# Patient Record
Sex: FEMALE | Race: WHITE
[De-identification: names, ages, dates, MRNs, and addresses within clinical notes are randomized per-mention and may not be internally consistent; named-entity substitution may affect disease eponyms.]

---

## 2019-04-09 ENCOUNTER — HOSPITAL ENCOUNTER (OUTPATIENT)
Dept: HOSPITAL 53 - M SFHCCLAY | Age: 34
End: 2019-04-09
Attending: NURSE PRACTITIONER
Payer: COMMERCIAL

## 2019-04-09 DIAGNOSIS — G47.30: Primary | ICD-10-CM

## 2019-04-09 DIAGNOSIS — Z13.1: ICD-10-CM

## 2019-04-09 LAB
ALBUMIN SERPL BCG-MCNC: 3.9 GM/DL (ref 3.2–5.2)
ALT SERPL W P-5'-P-CCNC: 24 U/L (ref 12–78)
BASOPHILS # BLD AUTO: 0 10^3/UL (ref 0–0.2)
BASOPHILS NFR BLD AUTO: 0.3 % (ref 0–1)
BILIRUB SERPL-MCNC: 0.3 MG/DL (ref 0.2–1)
BUN SERPL-MCNC: 13 MG/DL (ref 7–18)
CALCIUM SERPL-MCNC: 9.3 MG/DL (ref 8.5–10.1)
CHLORIDE SERPL-SCNC: 105 MEQ/L (ref 98–107)
CO2 SERPL-SCNC: 28 MEQ/L (ref 21–32)
CREAT SERPL-MCNC: 0.71 MG/DL (ref 0.55–1.3)
EOSINOPHIL # BLD AUTO: 0.1 10^3/UL (ref 0–0.5)
EOSINOPHIL NFR BLD AUTO: 1.5 % (ref 0–3)
EST. AVERAGE GLUCOSE BLD GHB EST-MCNC: 88 MG/DL (ref 60–110)
GFR SERPL CREATININE-BSD FRML MDRD: > 60 ML/MIN/{1.73_M2} (ref 60–?)
GLUCOSE SERPL-MCNC: 97 MG/DL (ref 70–100)
HCT VFR BLD AUTO: 37.5 % (ref 36–47)
HGB BLD-MCNC: 12.9 G/DL (ref 12–15.5)
LYMPHOCYTES # BLD AUTO: 2.6 10^3/UL (ref 1.5–4.5)
LYMPHOCYTES NFR BLD AUTO: 34.4 % (ref 24–44)
MCH RBC QN AUTO: 30.4 PG (ref 27–33)
MCHC RBC AUTO-ENTMCNC: 34.4 G/DL (ref 32–36.5)
MCV RBC AUTO: 88.2 FL (ref 80–96)
MONOCYTES # BLD AUTO: 0.6 10^3/UL (ref 0–0.8)
MONOCYTES NFR BLD AUTO: 8.1 % (ref 0–5)
NEUTROPHILS # BLD AUTO: 4.2 10^3/UL (ref 1.8–7.7)
NEUTROPHILS NFR BLD AUTO: 55.4 % (ref 36–66)
PLATELET # BLD AUTO: 342 10^3/UL (ref 150–450)
POTASSIUM SERPL-SCNC: 4.4 MEQ/L (ref 3.5–5.1)
PROT SERPL-MCNC: 7.4 GM/DL (ref 6.4–8.2)
RBC # BLD AUTO: 4.25 10^6/UL (ref 4–5.4)
SODIUM SERPL-SCNC: 138 MEQ/L (ref 136–145)
WBC # BLD AUTO: 7.5 10^3/UL (ref 4–10)

## 2019-04-09 PROCEDURE — 85025 COMPLETE CBC W/AUTO DIFF WBC: CPT

## 2019-04-09 PROCEDURE — 83036 HEMOGLOBIN GLYCOSYLATED A1C: CPT

## 2019-04-09 PROCEDURE — 80053 COMPREHEN METABOLIC PANEL: CPT

## 2019-06-13 ENCOUNTER — HOSPITAL ENCOUNTER (OUTPATIENT)
Dept: HOSPITAL 53 - M SLEEP | Age: 34
End: 2019-06-13
Attending: NURSE PRACTITIONER
Payer: COMMERCIAL

## 2019-06-13 DIAGNOSIS — G47.61: Primary | ICD-10-CM

## 2019-06-18 NOTE — SLEEPCENT
DATE OF PROCEDURE: 06/13/2019

 

ORDERED BY:  Elisha Shea NP

 

Nocturnal polysomnography was performed for evaluation of sleep physiology in

this patient with a history of abnormal breathing in sleep, hypnagogic and

hypnopomic hallucinations.

 

7 hours and 12 minutes of data were reviewed.  There 299 minutes of sleep

identified.  Sleep latency was prolonged at 82 minutes.  Rapid eye movement (REM)

latency was prolonged at 143 minutes.  Sleep architecture was fairly

well-preserved. There were two REM cycles noted.  Overall sleep efficiency was

70.9%.  Electrocardiogram showed sinus rhythm with an average heart rate of 80

beats per minute.  Electroencephalogram (EEG) showed some coarsening in

background.  No focal events were identified.  There were normal waveforms for

awake and sleep.  There were only 14 respiratory events identified of 10 seconds

in duration or greater for an apnea-hypopnea index within normal limits at 2.8.

Some snoring was noted.  Arousals from respiratory events in total occurred only

1.6 times per hour there was however significant limb movement activity seen.

There were three trains of 30 events and limb movement arousal index was 8.2.

 

IMPRESSION:

1.  Periodic limb movement disorder (G47.61).

2.  Limb movement arousal index 8.2.

 

RECOMMENDATIONS:

Interventions to reduce the frequency of arousals from limb activity may improve

the quality of the patient's sleep.

## 2019-08-22 ENCOUNTER — HOSPITAL ENCOUNTER (OUTPATIENT)
Dept: HOSPITAL 53 - M RAD | Age: 34
End: 2019-08-22
Attending: NURSE PRACTITIONER
Payer: COMMERCIAL

## 2019-08-22 DIAGNOSIS — R10.11: Primary | ICD-10-CM

## 2019-08-22 PROCEDURE — 78227 HEPATOBIL SYST IMAGE W/DRUG: CPT

## 2019-08-22 NOTE — REP
Hepatobiliary scan and gallbladder ejection fraction:

 

History:  Calculus in the gallbladder without cholecystitis.  Right upper

quadrant pain.

 

Technique:  6.6 mCi of technetium-99m mebrofenin was injected and sequential

anterior images are acquired.  65 minutes after the mebrofenin injection, the

patient consumed 8 ounces Ensure and an additional 60 minutes of imaging was

acquired.  Regions of interest are plotted around the gallbladder.

 

Findings:  The initial hepatocellular parenchymal uptake phase is normal and

homogeneous.  Intra- and extra-hepatic bile ducts are labeled by the 10 -minute

image.  The gallbladder is first labeled on the 10 -minute image.  There is

normal washout from the liver parenchyma into the gallbladder and small intestine

on subsequent images.  The gallbladder ejection fraction is 95 %. Values greater

than 35 % are considered normal with this technique.

 

Impression:

 

Normal hepatobiliary scan and normal gallbladder ejection fraction.

 

 

Electronically Signed by

Zohaib Figueredo MD 08/22/2019 11:59 A

## 2019-09-09 ENCOUNTER — HOSPITAL ENCOUNTER (OUTPATIENT)
Dept: HOSPITAL 53 - M SFHCCLAY | Age: 34
End: 2019-09-09
Attending: NURSE PRACTITIONER
Payer: COMMERCIAL

## 2019-09-09 DIAGNOSIS — Z01.419: Primary | ICD-10-CM

## 2020-10-29 ENCOUNTER — HOSPITAL ENCOUNTER (OUTPATIENT)
Dept: HOSPITAL 53 - M SFHCCLAY | Age: 35
End: 2020-10-29
Attending: NURSE PRACTITIONER
Payer: COMMERCIAL

## 2020-10-29 DIAGNOSIS — Z3A.01: Primary | ICD-10-CM

## 2020-10-29 PROCEDURE — 84702 CHORIONIC GONADOTROPIN TEST: CPT

## 2021-04-26 ENCOUNTER — HOSPITAL ENCOUNTER (EMERGENCY)
Dept: HOSPITAL 53 - M ED | Age: 36
Discharge: HOME | End: 2021-04-26
Payer: COMMERCIAL

## 2021-04-26 VITALS — HEIGHT: 66 IN | BODY MASS INDEX: 25.55 KG/M2 | WEIGHT: 158.95 LBS

## 2021-04-26 VITALS — SYSTOLIC BLOOD PRESSURE: 113 MMHG | DIASTOLIC BLOOD PRESSURE: 68 MMHG

## 2021-04-26 DIAGNOSIS — O03.9: Primary | ICD-10-CM

## 2021-04-26 DIAGNOSIS — I45.19: ICD-10-CM

## 2021-04-26 LAB
B-HCG SERPL-ACNC: 3361 MIU/ML
BASOPHILS # BLD AUTO: 0 10^3/UL (ref 0–0.2)
BASOPHILS NFR BLD AUTO: 0.1 % (ref 0–1)
BUN SERPL-MCNC: 10 MG/DL (ref 7–18)
CALCIUM SERPL-MCNC: 8.7 MG/DL (ref 8.5–10.1)
CHLORIDE SERPL-SCNC: 105 MEQ/L (ref 98–107)
CO2 SERPL-SCNC: 25 MEQ/L (ref 21–32)
CREAT SERPL-MCNC: 0.66 MG/DL (ref 0.55–1.3)
EOSINOPHIL # BLD AUTO: 0 10^3/UL (ref 0–0.5)
EOSINOPHIL NFR BLD AUTO: 0.2 % (ref 0–3)
GFR SERPL CREATININE-BSD FRML MDRD: > 60 ML/MIN/{1.73_M2} (ref 60–?)
GLUCOSE SERPL-MCNC: 92 MG/DL (ref 70–100)
HCT VFR BLD AUTO: 30 % (ref 36–47)
HGB BLD-MCNC: 10.3 G/DL (ref 12–15.5)
LYMPHOCYTES # BLD AUTO: 1.7 10^3/UL (ref 1.5–5)
LYMPHOCYTES NFR BLD AUTO: 11.8 % (ref 24–44)
MCH RBC QN AUTO: 30.9 PG (ref 27–33)
MCHC RBC AUTO-ENTMCNC: 34.3 G/DL (ref 32–36.5)
MCV RBC AUTO: 90.1 FL (ref 80–96)
MONOCYTES # BLD AUTO: 0.6 10^3/UL (ref 0–0.8)
MONOCYTES NFR BLD AUTO: 4.4 % (ref 2–8)
NEUTROPHILS # BLD AUTO: 11.7 10^3/UL (ref 1.5–8.5)
NEUTROPHILS NFR BLD AUTO: 82.9 % (ref 36–66)
PLATELET # BLD AUTO: 254 10^3/UL (ref 150–450)
POTASSIUM SERPL-SCNC: 3.7 MEQ/L (ref 3.5–5.1)
RBC # BLD AUTO: 3.33 10^6/UL (ref 4–5.4)
RSV RNA NPH QL NAA+PROBE: NEGATIVE
SODIUM SERPL-SCNC: 140 MEQ/L (ref 136–145)
WBC # BLD AUTO: 14.2 10^3/UL (ref 4–10)

## 2021-04-26 PROCEDURE — 86850 RBC ANTIBODY SCREEN: CPT

## 2021-04-26 PROCEDURE — 88305 TISSUE EXAM BY PATHOLOGIST: CPT

## 2021-04-26 PROCEDURE — 96361 HYDRATE IV INFUSION ADD-ON: CPT

## 2021-04-26 PROCEDURE — 86901 BLOOD TYPING SEROLOGIC RH(D): CPT

## 2021-04-26 PROCEDURE — 76801 OB US < 14 WKS SINGLE FETUS: CPT

## 2021-04-26 PROCEDURE — 87631 RESP VIRUS 3-5 TARGETS: CPT

## 2021-04-26 PROCEDURE — 86900 BLOOD TYPING SEROLOGIC ABO: CPT

## 2021-04-26 PROCEDURE — 99285 EMERGENCY DEPT VISIT HI MDM: CPT

## 2021-04-26 PROCEDURE — 96372 THER/PROPH/DIAG INJ SC/IM: CPT

## 2021-04-26 PROCEDURE — 84702 CHORIONIC GONADOTROPIN TEST: CPT

## 2021-04-26 PROCEDURE — 93005 ELECTROCARDIOGRAM TRACING: CPT

## 2021-04-26 PROCEDURE — 93041 RHYTHM ECG TRACING: CPT

## 2021-04-26 PROCEDURE — 80048 BASIC METABOLIC PNL TOTAL CA: CPT

## 2021-04-26 PROCEDURE — 85025 COMPLETE CBC W/AUTO DIFF WBC: CPT

## 2021-04-26 NOTE — ECGEPIP
[facility] - ED

                                       

                                       Test Date:    2021

Pat Name:     JUSTIN SHEEHAN        Department:   

Patient ID:   [id number]                 Room:         -

Gender:       Female                   Technician:   PIERRE

:          1985               Requested By: Maja Lundborg-Gray 

Order Number: [id number]     Reading MD:   Omega Ferrell

                                 Measurements

Intervals                              Axis          

Rate:         105                      P:            84

AL:           132                      QRS:          56

QRSD:         88                       T:            45

QT:           330                                    

QTc:          436                                    

                           Interpretive Statements

Sinus tachycardia

INCOMPLETE RIGHT BUNDLE BRANCH BLOCK

NO PRIORS FOR COMPARISON

Electronically Signed on 2021 20:36:03 EDT by Omega Ferrell

## 2021-04-26 NOTE — REP
INDICATION:

VAGINAL BLEEDING.



COMPARISON:

None.



TECHNIQUE:

Real-time sonographic evaluation of pelvis performed utilizing transabdominal

technique.



FINDINGS:

Uterus measures 11.7 x 5.7 x 7.9 cm.  Endometrial echo complex measures 12 mm.  No

intrauterine gestational sac or definite retained products of conception are

visualized.



Right ovary measures 5.6 x 3.3 x 4.2 cm.  A simple cyst measures 4.3 x 3.1 x 3.7 cm.

Left ovary measures 3.3 x 1.5 x 1.8 cm.  No other evidence of adnexal mass or free

fluid.



IMPRESSION:

Endometrial echo complex 12 mm.  No intrauterine gestational sac or definite retained

products of conception.



Simple cyst right ovary 4.3 cm.  No other evidence of adnexal mass or free fluid.





<Electronically signed by Nain Washington > 04/26/21 3020

## 2021-09-15 ENCOUNTER — HOSPITAL ENCOUNTER (EMERGENCY)
Dept: HOSPITAL 53 - M ED | Age: 36
LOS: 1 days | Discharge: HOME | End: 2021-09-16
Payer: COMMERCIAL

## 2021-09-15 VITALS — WEIGHT: 160.5 LBS | HEIGHT: 66 IN | BODY MASS INDEX: 25.79 KG/M2

## 2021-09-15 DIAGNOSIS — O20.0: Primary | ICD-10-CM

## 2021-09-15 DIAGNOSIS — Z3A.01: ICD-10-CM

## 2021-09-15 DIAGNOSIS — O20.8: ICD-10-CM

## 2021-09-15 DIAGNOSIS — Z87.59: ICD-10-CM

## 2021-09-15 LAB
BASOPHILS # BLD AUTO: 0 10^3/UL (ref 0–0.2)
BASOPHILS NFR BLD AUTO: 0.2 % (ref 0–1)
EOSINOPHIL # BLD AUTO: 0.1 10^3/UL (ref 0–0.5)
EOSINOPHIL NFR BLD AUTO: 1.2 % (ref 0–3)
HCT VFR BLD AUTO: 37.1 % (ref 36–47)
HGB BLD-MCNC: 12.8 G/DL (ref 12–15.5)
LYMPHOCYTES # BLD AUTO: 2.6 10^3/UL (ref 1.5–5)
LYMPHOCYTES NFR BLD AUTO: 30.7 % (ref 24–44)
MCH RBC QN AUTO: 30.9 PG (ref 27–33)
MCHC RBC AUTO-ENTMCNC: 34.5 G/DL (ref 32–36.5)
MCV RBC AUTO: 89.6 FL (ref 80–96)
MONOCYTES # BLD AUTO: 0.6 10^3/UL (ref 0–0.8)
MONOCYTES NFR BLD AUTO: 7 % (ref 2–8)
NEUTROPHILS # BLD AUTO: 5 10^3/UL (ref 1.5–8.5)
NEUTROPHILS NFR BLD AUTO: 60.4 % (ref 36–66)
PLATELET # BLD AUTO: 287 10^3/UL (ref 150–450)
RBC # BLD AUTO: 4.14 10^6/UL (ref 4–5.4)
WBC # BLD AUTO: 8.3 10^3/UL (ref 4–10)

## 2021-09-15 PROCEDURE — 85025 COMPLETE CBC W/AUTO DIFF WBC: CPT

## 2021-09-15 PROCEDURE — 87491 CHLMYD TRACH DNA AMP PROBE: CPT

## 2021-09-15 PROCEDURE — 96372 THER/PROPH/DIAG INJ SC/IM: CPT

## 2021-09-15 PROCEDURE — 87591 N.GONORRHOEAE DNA AMP PROB: CPT

## 2021-09-15 PROCEDURE — 86901 BLOOD TYPING SEROLOGIC RH(D): CPT

## 2021-09-15 PROCEDURE — 87210 SMEAR WET MOUNT SALINE/INK: CPT

## 2021-09-15 PROCEDURE — 76801 OB US < 14 WKS SINGLE FETUS: CPT

## 2021-09-15 PROCEDURE — 86900 BLOOD TYPING SEROLOGIC ABO: CPT

## 2021-09-15 PROCEDURE — 86850 RBC ANTIBODY SCREEN: CPT

## 2021-09-15 PROCEDURE — 99283 EMERGENCY DEPT VISIT LOW MDM: CPT

## 2021-09-15 PROCEDURE — 76817 TRANSVAGINAL US OBSTETRIC: CPT

## 2021-09-15 PROCEDURE — 81001 URINALYSIS AUTO W/SCOPE: CPT

## 2021-09-15 PROCEDURE — 84702 CHORIONIC GONADOTROPIN TEST: CPT

## 2021-09-15 NOTE — REPVR
PROCEDURE INFORMATION: 

Exam: US Pregnancy First Trimester, Transabdominal and US Pregnancy, 

Transvaginal 

Exam date and time: 9/15/2021 10:04 PM 

Age: 36 years old 

Clinical indication: Lmp or gestational age (in weeks): 6w 1d; Other: Vaginal 

bleeding; Pregnant 



TECHNIQUE: 

Imaging protocol: Real-time transabdominal obstetrical ultrasound of the 

maternal pelvis and a first trimester pregnancy, less than 14 weeks 0 days, 

with image documentation. Transvaginal imaging was used for better evaluation 

of the fetus, adnexa, and/or cervix. 



COMPARISON: 

No relevant prior studies available. 



FINDINGS: 

Gestation: Single live intrauterine gestation. 

Embryonic/fetal heart rate: Fetal cardiac activity is detected at 99 bpm. 

Extra-embryonic membranes/Placenta: There is adjacent subchorionic hemorrhage 

measuring 2.2 x 2.3 cm. 

Amniotic fluid: Amniotic fluid is normal for gestational age. 



BIOMETRY: 

Gestational age (AUA): Estimated gestational age is 6 weeks and 1 day. 

Crown-Rump length: Crown-rump length measures 40 mm. 



MATERNAL: 

Uterus: Uterus measures 8.8 x 5.7 x 7.7 cm. 

Cervix: Unremarkable. 

Right adnexa: Right ovary measures 4.4 x 4.0 x 4.0 cm. Simple appearing right 

ovarian cyst measuring 3.7 cm. 

Left adnexa: Left ovary is not visualized. 

Intraperitoneal space: No intraperitoneal free fluid. 



IMPRESSION: 

1. Single live intrauterine gestation as above with adjacent subchorionic 

hemorrhage. 

2. Fetal cardiac activity is detected at 99 bpm, low. 

3. Follow-up is recommended. 



Electronically signed by: Flako Guerra On 09/15/2021  23:08:39 PM

## 2021-09-16 ENCOUNTER — HOSPITAL ENCOUNTER (OUTPATIENT)
Dept: HOSPITAL 53 - M WHC | Age: 36
End: 2021-09-16
Attending: OBSTETRICS & GYNECOLOGY
Payer: COMMERCIAL

## 2021-09-16 VITALS — DIASTOLIC BLOOD PRESSURE: 76 MMHG | SYSTOLIC BLOOD PRESSURE: 124 MMHG

## 2021-09-16 DIAGNOSIS — O36.80X1: ICD-10-CM

## 2021-09-16 DIAGNOSIS — Z3A.00: ICD-10-CM

## 2021-09-16 DIAGNOSIS — Z53.9: Primary | ICD-10-CM

## 2021-09-16 LAB — N GONORRHOEA RRNA SPEC QL NAA+PROBE: NEGATIVE

## 2021-09-16 NOTE — ED PDOC
Post-Departure Follow-Up


dr pradhan faxed 1st trimester us for fu mlg Lundborg-Gray,Maja MD          Sep 16, 2021 11:23

## 2021-09-29 ENCOUNTER — HOSPITAL ENCOUNTER (OUTPATIENT)
Dept: HOSPITAL 53 - M PLALAB | Age: 36
End: 2021-09-29
Attending: OBSTETRICS & GYNECOLOGY
Payer: COMMERCIAL

## 2021-09-29 DIAGNOSIS — N96: Primary | ICD-10-CM

## 2021-09-29 PROCEDURE — 86644 CMV ANTIBODY: CPT

## 2021-09-29 PROCEDURE — 86777 TOXOPLASMA ANTIBODY: CPT

## 2021-09-29 PROCEDURE — 86778 TOXOPLASMA ANTIBODY IGM: CPT

## 2021-09-29 PROCEDURE — 85730 THROMBOPLASTIN TIME PARTIAL: CPT

## 2021-09-29 PROCEDURE — 86645 CMV ANTIBODY IGM: CPT

## 2021-09-29 PROCEDURE — 36415 COLL VENOUS BLD VENIPUNCTURE: CPT

## 2021-09-29 PROCEDURE — 86147 CARDIOLIPIN ANTIBODY EA IG: CPT

## 2021-10-01 LAB
CARDIOLIPIN IGA SER IA-ACNC: <9 APL U/ML (ref 0–11)
CARDIOLIPIN IGG SER IA-ACNC: <9 GPL U/ML (ref 0–14)
CARDIOLIPIN IGM SER IA-ACNC: <9 MPL U/ML (ref 0–12)
CMV IGG SERPL IA-ACNC: >10 U/ML (ref 0–0.59)
CMV IGM SERPL IA-ACNC: <30 AU/ML (ref 0–29.9)
T GONDII IGG SERPL IA-ACNC: <3 IU/ML (ref 0–7.1)

## 2021-10-08 LAB
DRVVT CONFIRM PPP QL: 35.9 SEC
DRVVT SCREEN TO CONFIRM RATIO: 1 {RATIO} (ref 0–1.2)

## 2021-11-16 ENCOUNTER — HOSPITAL ENCOUNTER (OUTPATIENT)
Dept: HOSPITAL 53 - M PLALAB | Age: 36
End: 2021-11-16
Attending: OBSTETRICS & GYNECOLOGY
Payer: COMMERCIAL

## 2021-11-16 DIAGNOSIS — Z34.81: Primary | ICD-10-CM

## 2021-11-16 DIAGNOSIS — Z3A.00: ICD-10-CM

## 2021-11-16 LAB
BASOPHILS # BLD AUTO: 0 10^3/UL (ref 0–0.2)
BASOPHILS NFR BLD AUTO: 0.2 % (ref 0–1)
EOSINOPHIL # BLD AUTO: 0 10^3/UL (ref 0–0.5)
EOSINOPHIL NFR BLD AUTO: 0.4 % (ref 0–3)
HCT VFR BLD AUTO: 36.6 % (ref 36–47)
HCV AB SER QL: 0.1 INDEX (ref ?–0.8)
HGB BLD-MCNC: 12.7 G/DL (ref 12–15.5)
HIV 1+2 AB+HIV1 P24 AG SERPL QL IA: NEGATIVE
LYMPHOCYTES # BLD AUTO: 2.1 10^3/UL (ref 1.5–5)
LYMPHOCYTES NFR BLD AUTO: 18.9 % (ref 24–44)
MCH RBC QN AUTO: 30.5 PG (ref 27–33)
MCHC RBC AUTO-ENTMCNC: 34.7 G/DL (ref 32–36.5)
MCV RBC AUTO: 88 FL (ref 80–96)
MONOCYTES # BLD AUTO: 0.8 10^3/UL (ref 0–0.8)
MONOCYTES NFR BLD AUTO: 7.4 % (ref 2–8)
N GONORRHOEA RRNA SPEC QL NAA+PROBE: NEGATIVE
NEUTROPHILS # BLD AUTO: 8 10^3/UL (ref 1.5–8.5)
NEUTROPHILS NFR BLD AUTO: 72.7 % (ref 36–66)
PLATELET # BLD AUTO: 299 10^3/UL (ref 150–450)
RBC # BLD AUTO: 4.16 10^6/UL (ref 4–5.4)
WBC # BLD AUTO: 11 10^3/UL (ref 4–10)

## 2021-12-15 ENCOUNTER — HOSPITAL ENCOUNTER (OUTPATIENT)
Dept: HOSPITAL 53 - M PLALAB | Age: 36
End: 2021-12-15
Attending: OBSTETRICS & GYNECOLOGY
Payer: COMMERCIAL

## 2021-12-15 DIAGNOSIS — O28.5: Primary | ICD-10-CM

## 2021-12-15 DIAGNOSIS — Z23: ICD-10-CM

## 2021-12-15 DIAGNOSIS — Z3A.00: ICD-10-CM

## 2021-12-15 PROCEDURE — 36415 COLL VENOUS BLD VENIPUNCTURE: CPT

## 2021-12-15 PROCEDURE — 90686 IIV4 VACC NO PRSV 0.5 ML IM: CPT

## 2021-12-15 PROCEDURE — 90471 IMMUNIZATION ADMIN: CPT

## 2022-02-07 ENCOUNTER — HOSPITAL ENCOUNTER (OUTPATIENT)
Dept: HOSPITAL 53 - M PLALAB | Age: 37
End: 2022-02-07
Attending: OBSTETRICS & GYNECOLOGY
Payer: COMMERCIAL

## 2022-02-07 DIAGNOSIS — Z67.91: Primary | ICD-10-CM

## 2022-02-07 PROCEDURE — 86886 COOMBS TEST INDIRECT TITER: CPT

## 2022-02-07 PROCEDURE — 36415 COLL VENOUS BLD VENIPUNCTURE: CPT

## 2022-02-15 ENCOUNTER — HOSPITAL ENCOUNTER (OUTPATIENT)
Dept: HOSPITAL 53 - M WHC | Age: 37
End: 2022-02-15
Attending: ADVANCED PRACTICE MIDWIFE
Payer: COMMERCIAL

## 2022-02-15 DIAGNOSIS — Z3A.15: ICD-10-CM

## 2022-02-15 DIAGNOSIS — Z34.82: Primary | ICD-10-CM

## 2022-02-22 ENCOUNTER — HOSPITAL ENCOUNTER (OUTPATIENT)
Dept: HOSPITAL 53 - M SFHCCLAY | Age: 37
End: 2022-02-22
Attending: NURSE PRACTITIONER
Payer: COMMERCIAL

## 2022-02-22 DIAGNOSIS — J02.9: Primary | ICD-10-CM

## 2022-03-29 ENCOUNTER — HOSPITAL ENCOUNTER (OUTPATIENT)
Dept: HOSPITAL 53 - M PLALAB | Age: 37
End: 2022-03-29
Attending: OBSTETRICS & GYNECOLOGY
Payer: COMMERCIAL

## 2022-03-29 DIAGNOSIS — Z3A.23: ICD-10-CM

## 2022-03-29 DIAGNOSIS — Z34.92: Primary | ICD-10-CM

## 2022-03-29 LAB
HCT VFR BLD AUTO: 31.9 % (ref 36–47)
HGB BLD-MCNC: 11.2 G/DL (ref 12–15.5)
MCH RBC QN AUTO: 31.2 PG (ref 27–33)
MCHC RBC AUTO-ENTMCNC: 35.1 G/DL (ref 32–36.5)
MCV RBC AUTO: 88.9 FL (ref 80–96)
N GONORRHOEA RRNA SPEC QL NAA+PROBE: NEGATIVE
PLATELET # BLD AUTO: 228 10^3/UL (ref 150–450)
RBC # BLD AUTO: 3.59 10^6/UL (ref 4–5.4)
WBC # BLD AUTO: 10.1 10^3/UL (ref 4–10)

## 2022-03-29 PROCEDURE — 86901 BLOOD TYPING SEROLOGIC RH(D): CPT

## 2022-03-29 PROCEDURE — 82950 GLUCOSE TEST: CPT

## 2022-03-29 PROCEDURE — 87850 N. GONORRHOEAE ASSAY W/OPTIC: CPT

## 2022-03-29 PROCEDURE — 36415 COLL VENOUS BLD VENIPUNCTURE: CPT

## 2022-03-29 PROCEDURE — 87810 CHLMYD TRACH ASSAY W/OPTIC: CPT

## 2022-03-29 PROCEDURE — 86900 BLOOD TYPING SEROLOGIC ABO: CPT

## 2022-03-29 PROCEDURE — 85027 COMPLETE CBC AUTOMATED: CPT

## 2022-03-29 PROCEDURE — 86850 RBC ANTIBODY SCREEN: CPT

## 2022-05-31 ENCOUNTER — HOSPITAL ENCOUNTER (OUTPATIENT)
Dept: HOSPITAL 53 - M SFHCPLAZ | Age: 37
End: 2022-05-31
Attending: OBSTETRICS & GYNECOLOGY
Payer: COMMERCIAL

## 2022-05-31 DIAGNOSIS — Z36.85: Primary | ICD-10-CM

## 2022-05-31 PROCEDURE — 87081 CULTURE SCREEN ONLY: CPT

## 2022-06-22 ENCOUNTER — HOSPITAL ENCOUNTER (OUTPATIENT)
Dept: HOSPITAL 53 - M LABSMTC | Age: 37
End: 2022-06-22
Attending: ANESTHESIOLOGY
Payer: COMMERCIAL

## 2022-06-22 DIAGNOSIS — Z01.812: Primary | ICD-10-CM

## 2022-06-22 DIAGNOSIS — Z20.822: ICD-10-CM

## 2022-06-27 ENCOUNTER — HOSPITAL ENCOUNTER (INPATIENT)
Dept: HOSPITAL 53 - M LDI | Age: 37
LOS: 2 days | Discharge: HOME | DRG: 764 | End: 2022-06-29
Attending: OBSTETRICS & GYNECOLOGY | Admitting: OBSTETRICS & GYNECOLOGY
Payer: COMMERCIAL

## 2022-06-27 VITALS — SYSTOLIC BLOOD PRESSURE: 118 MMHG | DIASTOLIC BLOOD PRESSURE: 73 MMHG

## 2022-06-27 VITALS — SYSTOLIC BLOOD PRESSURE: 112 MMHG | DIASTOLIC BLOOD PRESSURE: 70 MMHG

## 2022-06-27 VITALS — HEIGHT: 65 IN | WEIGHT: 205.47 LBS | BODY MASS INDEX: 34.23 KG/M2

## 2022-06-27 VITALS — SYSTOLIC BLOOD PRESSURE: 103 MMHG | DIASTOLIC BLOOD PRESSURE: 60 MMHG

## 2022-06-27 VITALS — SYSTOLIC BLOOD PRESSURE: 98 MMHG | DIASTOLIC BLOOD PRESSURE: 54 MMHG

## 2022-06-27 VITALS — DIASTOLIC BLOOD PRESSURE: 70 MMHG | SYSTOLIC BLOOD PRESSURE: 112 MMHG

## 2022-06-27 DIAGNOSIS — O34.211: Primary | ICD-10-CM

## 2022-06-27 DIAGNOSIS — Z30.2: ICD-10-CM

## 2022-06-27 DIAGNOSIS — Z3A.39: ICD-10-CM

## 2022-06-27 LAB
HCT VFR BLD AUTO: 37 % (ref 36–47)
HGB BLD-MCNC: 13.2 G/DL (ref 12–15.5)
MCH RBC QN AUTO: 32 PG (ref 27–33)
MCHC RBC AUTO-ENTMCNC: 35.7 G/DL (ref 32–36.5)
MCV RBC AUTO: 89.6 FL (ref 80–96)
PLATELET # BLD AUTO: 160 10^3/UL (ref 150–450)
RBC # BLD AUTO: 4.13 10^6/UL (ref 4–5.4)
WBC # BLD AUTO: 10.4 10^3/UL (ref 4–10)

## 2022-06-27 PROCEDURE — 0UT70ZZ RESECTION OF BILATERAL FALLOPIAN TUBES, OPEN APPROACH: ICD-10-PCS | Performed by: OBSTETRICS & GYNECOLOGY

## 2022-06-27 RX ADMIN — Medication SCH TAB: at 09:00

## 2022-06-27 RX ADMIN — SODIUM CHLORIDE, POTASSIUM CHLORIDE, SODIUM LACTATE AND CALCIUM CHLORIDE SCH MLS/HR: 600; 310; 30; 20 INJECTION, SOLUTION INTRAVENOUS at 17:13

## 2022-06-27 RX ADMIN — DOCUSATE SODIUM SCH MG: 100 CAPSULE, LIQUID FILLED ORAL at 09:00

## 2022-06-27 RX ADMIN — KETOROLAC TROMETHAMINE SCH MG: 30 INJECTION, SOLUTION INTRAMUSCULAR at 14:30

## 2022-06-27 RX ADMIN — DOCUSATE SODIUM SCH MG: 100 CAPSULE, LIQUID FILLED ORAL at 20:39

## 2022-06-27 RX ADMIN — SODIUM CHLORIDE, POTASSIUM CHLORIDE, SODIUM LACTATE AND CALCIUM CHLORIDE SCH MLS/HR: 600; 310; 30; 20 INJECTION, SOLUTION INTRAVENOUS at 09:35

## 2022-06-27 RX ADMIN — KETOROLAC TROMETHAMINE SCH MG: 30 INJECTION, SOLUTION INTRAMUSCULAR at 20:39

## 2022-06-28 VITALS — DIASTOLIC BLOOD PRESSURE: 74 MMHG | SYSTOLIC BLOOD PRESSURE: 120 MMHG

## 2022-06-28 VITALS — SYSTOLIC BLOOD PRESSURE: 106 MMHG | DIASTOLIC BLOOD PRESSURE: 57 MMHG

## 2022-06-28 VITALS — SYSTOLIC BLOOD PRESSURE: 104 MMHG | DIASTOLIC BLOOD PRESSURE: 51 MMHG

## 2022-06-28 VITALS — SYSTOLIC BLOOD PRESSURE: 123 MMHG | DIASTOLIC BLOOD PRESSURE: 61 MMHG

## 2022-06-28 VITALS — DIASTOLIC BLOOD PRESSURE: 57 MMHG | SYSTOLIC BLOOD PRESSURE: 109 MMHG

## 2022-06-28 VITALS — DIASTOLIC BLOOD PRESSURE: 51 MMHG | SYSTOLIC BLOOD PRESSURE: 95 MMHG

## 2022-06-28 LAB
HCT VFR BLD AUTO: 30.7 % (ref 36–47)
HGB BLD-MCNC: 10.7 G/DL (ref 12–15.5)
MCH RBC QN AUTO: 31.2 PG (ref 27–33)
MCHC RBC AUTO-ENTMCNC: 34.9 G/DL (ref 32–36.5)
MCV RBC AUTO: 89.5 FL (ref 80–96)
PLATELET # BLD AUTO: 156 10^3/UL (ref 150–450)
RBC # BLD AUTO: 3.43 10^6/UL (ref 4–5.4)
WBC # BLD AUTO: 13.4 10^3/UL (ref 4–10)

## 2022-06-28 RX ADMIN — Medication SCH TAB: at 07:33

## 2022-06-28 RX ADMIN — IBUPROFEN SCH MG: 800 TABLET, FILM COATED ORAL at 17:39

## 2022-06-28 RX ADMIN — SODIUM CHLORIDE, POTASSIUM CHLORIDE, SODIUM LACTATE AND CALCIUM CHLORIDE SCH MLS/HR: 600; 310; 30; 20 INJECTION, SOLUTION INTRAVENOUS at 01:05

## 2022-06-28 RX ADMIN — KETOROLAC TROMETHAMINE SCH MG: 30 INJECTION, SOLUTION INTRAMUSCULAR at 02:30

## 2022-06-28 RX ADMIN — IBUPROFEN SCH MG: 800 TABLET, FILM COATED ORAL at 17:40

## 2022-06-28 RX ADMIN — IBUPROFEN SCH MG: 800 TABLET, FILM COATED ORAL at 09:21

## 2022-06-28 RX ADMIN — DOCUSATE SODIUM SCH MG: 100 CAPSULE, LIQUID FILLED ORAL at 07:33

## 2022-06-28 RX ADMIN — DOCUSATE SODIUM SCH MG: 100 CAPSULE, LIQUID FILLED ORAL at 21:00

## 2022-06-29 VITALS — SYSTOLIC BLOOD PRESSURE: 104 MMHG | DIASTOLIC BLOOD PRESSURE: 50 MMHG

## 2022-06-29 RX ADMIN — DOCUSATE SODIUM SCH MG: 100 CAPSULE, LIQUID FILLED ORAL at 09:07

## 2022-06-29 RX ADMIN — Medication SCH TAB: at 09:08

## 2022-06-29 RX ADMIN — IBUPROFEN SCH MG: 800 TABLET, FILM COATED ORAL at 10:30

## 2023-01-03 ENCOUNTER — HOSPITAL ENCOUNTER (OUTPATIENT)
Dept: HOSPITAL 53 - M SFHCCLAY | Age: 38
End: 2023-01-03
Attending: NURSE PRACTITIONER
Payer: COMMERCIAL

## 2023-01-03 DIAGNOSIS — Z01.419: Primary | ICD-10-CM

## 2023-05-16 ENCOUNTER — HOSPITAL ENCOUNTER (OUTPATIENT)
Dept: HOSPITAL 53 - M SFHCCLAY | Age: 38
End: 2023-05-16
Attending: PHYSICIAN ASSISTANT
Payer: COMMERCIAL

## 2023-05-16 DIAGNOSIS — R50.9: Primary | ICD-10-CM

## 2023-05-16 PROCEDURE — 87486 CHLMYD PNEUM DNA AMP PROBE: CPT

## 2023-05-16 PROCEDURE — 87633 RESP VIRUS 12-25 TARGETS: CPT

## 2023-05-16 PROCEDURE — 87880 STREP A ASSAY W/OPTIC: CPT

## 2023-05-16 PROCEDURE — 87581 M.PNEUMON DNA AMP PROBE: CPT

## 2023-05-16 PROCEDURE — 87798 DETECT AGENT NOS DNA AMP: CPT
